# Patient Record
Sex: MALE | Race: BLACK OR AFRICAN AMERICAN | Employment: UNEMPLOYED | URBAN - METROPOLITAN AREA
[De-identification: names, ages, dates, MRNs, and addresses within clinical notes are randomized per-mention and may not be internally consistent; named-entity substitution may affect disease eponyms.]

---

## 2018-12-02 ENCOUNTER — OFFICE VISIT (OUTPATIENT)
Dept: URGENT CARE | Facility: URGENT CARE | Age: 33
End: 2018-12-02
Payer: MEDICAID

## 2018-12-02 VITALS
OXYGEN SATURATION: 100 % | WEIGHT: 160 LBS | SYSTOLIC BLOOD PRESSURE: 128 MMHG | HEART RATE: 86 BPM | TEMPERATURE: 98.2 F | RESPIRATION RATE: 18 BRPM | DIASTOLIC BLOOD PRESSURE: 80 MMHG

## 2018-12-02 DIAGNOSIS — N39.0 URINARY TRACT INFECTION WITHOUT HEMATURIA, SITE UNSPECIFIED: ICD-10-CM

## 2018-12-02 DIAGNOSIS — Z11.3 SCREEN FOR STD (SEXUALLY TRANSMITTED DISEASE): ICD-10-CM

## 2018-12-02 DIAGNOSIS — R82.90 ABNORMAL URINE ODOR: Primary | ICD-10-CM

## 2018-12-02 LAB
ALBUMIN UR-MCNC: NEGATIVE MG/DL
APPEARANCE UR: CLEAR
BACTERIA #/AREA URNS HPF: ABNORMAL /HPF
BILIRUB UR QL STRIP: NEGATIVE
COLOR UR AUTO: YELLOW
GLUCOSE UR STRIP-MCNC: NEGATIVE MG/DL
HGB UR QL STRIP: NEGATIVE
KETONES UR STRIP-MCNC: NEGATIVE MG/DL
LEUKOCYTE ESTERASE UR QL STRIP: ABNORMAL
MUCOUS THREADS #/AREA URNS LPF: PRESENT /LPF
NITRATE UR QL: NEGATIVE
PH UR STRIP: 5.5 PH (ref 5–7)
RBC #/AREA URNS AUTO: ABNORMAL /HPF
SOURCE: ABNORMAL
SP GR UR STRIP: 1.01 (ref 1–1.03)
UROBILINOGEN UR STRIP-ACNC: 0.2 EU/DL (ref 0.2–1)
WBC #/AREA URNS AUTO: ABNORMAL /HPF

## 2018-12-02 PROCEDURE — 87591 N.GONORRHOEAE DNA AMP PROB: CPT | Performed by: PHYSICIAN ASSISTANT

## 2018-12-02 PROCEDURE — 36415 COLL VENOUS BLD VENIPUNCTURE: CPT | Performed by: PHYSICIAN ASSISTANT

## 2018-12-02 PROCEDURE — 87086 URINE CULTURE/COLONY COUNT: CPT | Performed by: PHYSICIAN ASSISTANT

## 2018-12-02 PROCEDURE — 87529 HSV DNA AMP PROBE: CPT | Mod: 59 | Performed by: PHYSICIAN ASSISTANT

## 2018-12-02 PROCEDURE — 86780 TREPONEMA PALLIDUM: CPT | Performed by: PHYSICIAN ASSISTANT

## 2018-12-02 PROCEDURE — 81001 URINALYSIS AUTO W/SCOPE: CPT | Performed by: PHYSICIAN ASSISTANT

## 2018-12-02 PROCEDURE — 87529 HSV DNA AMP PROBE: CPT | Performed by: PHYSICIAN ASSISTANT

## 2018-12-02 PROCEDURE — 86803 HEPATITIS C AB TEST: CPT | Performed by: PHYSICIAN ASSISTANT

## 2018-12-02 PROCEDURE — 86706 HEP B SURFACE ANTIBODY: CPT | Performed by: PHYSICIAN ASSISTANT

## 2018-12-02 PROCEDURE — 87491 CHLMYD TRACH DNA AMP PROBE: CPT | Performed by: PHYSICIAN ASSISTANT

## 2018-12-02 PROCEDURE — 87389 HIV-1 AG W/HIV-1&-2 AB AG IA: CPT | Performed by: PHYSICIAN ASSISTANT

## 2018-12-02 PROCEDURE — 87340 HEPATITIS B SURFACE AG IA: CPT | Performed by: PHYSICIAN ASSISTANT

## 2018-12-02 PROCEDURE — 99203 OFFICE O/P NEW LOW 30 MIN: CPT | Performed by: PHYSICIAN ASSISTANT

## 2018-12-02 RX ORDER — METRONIDAZOLE 500 MG/1
500 TABLET ORAL 2 TIMES DAILY
Qty: 14 TABLET | Refills: 0 | Status: SHIPPED | OUTPATIENT
Start: 2018-12-02 | End: 2018-12-02

## 2018-12-02 RX ORDER — METRONIDAZOLE 500 MG/1
500 TABLET ORAL 2 TIMES DAILY
Qty: 14 TABLET | Refills: 0 | Status: SHIPPED | OUTPATIENT
Start: 2018-12-02 | End: 2019-03-15

## 2018-12-02 RX ORDER — SULFAMETHOXAZOLE/TRIMETHOPRIM 800-160 MG
1 TABLET ORAL 2 TIMES DAILY
Qty: 14 TABLET | Refills: 0 | Status: SHIPPED | OUTPATIENT
Start: 2018-12-02 | End: 2019-03-15

## 2018-12-02 RX ORDER — SULFAMETHOXAZOLE/TRIMETHOPRIM 800-160 MG
1 TABLET ORAL 2 TIMES DAILY
Qty: 14 TABLET | Refills: 0 | Status: SHIPPED | OUTPATIENT
Start: 2018-12-02 | End: 2018-12-02

## 2018-12-02 NOTE — MR AVS SNAPSHOT
"              After Visit Summary   2018    Saroj Castillo    MRN: [id number]           Patient Information     Date Of Birth          1985        Visit Information        Provider Department      2018 3:40 PM Janneth Nesbitt PA-C [facility]        Today's Diagnoses     Abnormal urine odor    -  1    Screen for STD (sexually transmitted disease)        Urinary tract infection without hematuria, site unspecified           Follow-ups after your visit        Who to contact     If you have questions or need follow up information about today's clinic visit or your schedule please contact [facility] directly at [phone].  Normal or non-critical lab and imaging results will be communicated to you by MyChart, letter or phone within 4 business days after the clinic has received the results. If you do not hear from us within 7 days, please contact the clinic through [facility] or phone. If you have a critical or abnormal lab result, we will notify you by phone as soon as possible.  Submit refill requests through [employer] or call your pharmacy and they will forward the refill request to us. Please allow 3 business days for your refill to be completed.          Additional Information About Your Visit        MyChart Information     [employer] lets you send messages to your doctor, view your test results, renew your prescriptions, schedule appointments and more. To sign up, go to [email or web address] . Click on \"Log in\" on the left side of the screen, which will take you to the Welcome page. Then click on \"Sign up Now\" on the right side of the page.     You will be asked to enter the access code listed below, as well as some personal information. Please follow the directions to create your username and password.     Your access code is: [email or web address]  Expires: 3/2/2019  5:02 PM     Your access code will  in 90 days. If you need help or a new code, please call your " [facility] or [phone].        Care EveryWhere ID     This is your Care EveryWhere ID. This could be used by other organizations to access your [facility] medical records  [id number]        Your Vitals Were     Pulse Temperature Respirations Pulse Oximetry          86 98.2  F (36.8  C) (Oral) 18 100%         Blood Pressure from Last 3 Encounters:   12/02/18 128/80    Weight from Last 3 Encounters:   12/02/18 160 lb (72.6 kg)              We Performed the Following     **Hepatitis C Screen Reflex to RNA FUTURE anytime     CHLAMYDIA TRACHOMATIS PCR     Hepatitis B Surface Antibody     Hepatitis B surface antigen     HIV Antigen Antibody Combo     HSV 1 and 2 DNA by PCR     NEISSERIA GONORRHOEA PCR     Treponema Abs w Reflex to RPR and Titer     UA with Microscopic reflex to Culture     Urine Culture Aerobic Bacterial          Today's Medication Changes          These changes are accurate as of 12/2/18  5:02 PM.  If you have any questions, ask your nurse or doctor.               Start taking these medicines.        Dose/Directions    metroNIDAZOLE 500 MG tablet   Commonly known as:  FLAGYL   Used for:  Abnormal urine odor   Started by:  Janneth Nesbitt PA-C        Dose:  500 mg   Take 1 tablet (500 mg) by mouth 2 times daily   Quantity:  14 tablet   Refills:  0       sulfamethoxazole-trimethoprim 800-160 MG tablet   Commonly known as:  BACTRIM DS/SEPTRA DS   Used for:  Abnormal urine odor, Screen for STD (sexually transmitted disease)   Started by:  Janneth Nesbitt PA-C        Dose:  1 tablet   Take 1 tablet by mouth 2 times daily   Quantity:  14 tablet   Refills:  0            Where to get your medicines      These medications were sent to [facility] [place], [place] - [address]  [address], [facility] [address]     Phone:  [phone]     metroNIDAZOLE 500 MG tablet    sulfamethoxazole-trimethoprim 800-160 MG tablet                Primary Care Provider Fax #     Physician No Ref-Primary [phone]       No address on file        Equal Access to Services     GERRYLAMBERT RIGO : Hadii aad ku hadstephenaston Jessietam, wanataliada hayleydarylha, harshata trikamrantalya isbellajaytalya, elsie solano elizabethmiguelina dickeyidaliatemi rodriguez. So [facility] [place] [phone].    ATENCIÓN: Si habla español, tiene a anne disposición servicios gratuitos de asistencia lingüística. Llame al [phone].    We comply with applicable federal civil rights laws and [place] laws. We do not discriminate on the basis of race, color, national origin, age, disability, sex, sexual orientation, or gender identity.            Thank you!     Thank you for choosing [facility]  for your care. Our goal is always to provide you with excellent care. Hearing back from our patients is one way we can continue to improve our services. Please take a few minutes to complete the written survey that you may receive in the mail after your visit with us. Thank you!             Your Updated Medication List - Protect others around you: Learn how to safely use, store and throw away your medicines at www.disposemymeds.org.          This list is accurate as of 12/2/18  5:02 PM.  Always use your most recent med list.                   Brand Name Dispense Instructions for use Diagnosis    metroNIDAZOLE 500 MG tablet    FLAGYL    14 tablet    Take 1 tablet (500 mg) by mouth 2 times daily    Abnormal urine odor       sulfamethoxazole-trimethoprim 800-160 MG tablet    BACTRIM DS/SEPTRA DS    14 tablet    Take 1 tablet by mouth 2 times daily    Abnormal urine odor, Screen for STD (sexually transmitted disease)

## 2018-12-02 NOTE — PROGRESS NOTES
S: 32 yo male here for STD screening.  Wants testing for all STDs.  His girlfriend told him she has trichomonas.  He denies any fever, abdominal pain, back pain or dysuria.  He has noted an odorous urine for a few days. No penile discherge    PMHX-   He had chlamydia several years ago.    FHX- no kidney stones    Allergies not on file    No past medical history on file.     Social - nonsmoker      No current outpatient prescriptions on file prior to visit.  No current facility-administered medications on file prior to visit.     Social History   Substance Use Topics     Smoking status: Not on file     Smokeless tobacco: Not on file     Alcohol use Not on file       ROS:  General: negative for fever  ABD: Denies abd pain  : as above    OBJECTIVE:  /80 (BP Location: Left arm, Patient Position: Chair, Cuff Size: Adult Regular)  Pulse 86  Temp 98.2  F (36.8  C) (Oral)  Resp 18  Wt 160 lb (72.6 kg)  SpO2 100%   General:   awake, alert, and cooperative.  NAD.   Head: Normocephalic, atraumatic.  Eyes: Conjunctiva clear, non icteric.   ABD: soft, no tenderness to palpation , no rigidity, guarding or rebound . No CVAT  Neuro: Alert and oriented - normal speech.     ASSESSMENT:    ICD-10-CM    1. Abnormal urine odor R82.90 NEISSERIA GONORRHOEA PCR     CHLAMYDIA TRACHOMATIS PCR     HIV Antigen Antibody Combo     HSV 1 and 2 DNA by PCR     Hepatitis B Surface Antibody     Hepatitis B surface antigen     Treponema Abs w Reflex to RPR and Titer     **Hepatitis C Screen Reflex to RNA FUTURE anytime     UA with Microscopic reflex to Culture     CANCELED: *UA reflex to Microscopic and Culture ([place] [facility] (except [place] and [place])   2. Screen for STD (sexually transmitted disease) Z11.3 NEISSERIA GONORRHOEA PCR     CHLAMYDIA TRACHOMATIS PCR     HIV Antigen Antibody Combo     HSV 1 and 2 DNA by PCR     Hepatitis B Surface Antibody     Hepatitis B surface antigen     Treponema Abs w Reflex to RPR and  Titer     **Hepatitis C Screen Reflex to RNA FUTURE anytime     UA with Microscopic reflex to Culture     CANCELED: *UA reflex to Microscopic and Culture ([place] [facility] (except [place] and [place])           PLAN: Spoke with . He did not see any trichomonas on the urine slide under the microscope. He saw only a few bacteria. Will treat for trich as he had exposure and also treat for possible UTI.  As per ordered above.  No alcohol with Flagyl. Drink plenty of fluids.  Prevention and treatment of UTI's discussed. Follow up with primary care physician if not improving.  Advised about symptoms which might herald more serious problems.   UC pending.    Janneth Nesbitt PA-C

## 2018-12-03 LAB
BACTERIA SPEC CULT: NO GROWTH
HBV SURFACE AB SERPL IA-ACNC: 1.55 M[IU]/ML
HBV SURFACE AG SERPL QL IA: NONREACTIVE
HCV AB SERPL QL IA: NONREACTIVE
HIV 1+2 AB+HIV1 P24 AG SERPL QL IA: NONREACTIVE
SPECIMEN SOURCE: NORMAL
T PALLIDUM AB SER QL: NONREACTIVE

## 2018-12-04 LAB
HSV1 DNA SPEC QL NAA+PROBE: NEGATIVE
HSV2 DNA SPEC QL NAA+PROBE: NEGATIVE
SPECIMEN SOURCE: NORMAL

## 2018-12-05 LAB
C TRACH DNA SPEC QL NAA+PROBE: NEGATIVE
N GONORRHOEA DNA SPEC QL NAA+PROBE: NEGATIVE
SPECIMEN SOURCE: NORMAL
SPECIMEN SOURCE: NORMAL

## 2018-12-08 ENCOUNTER — TELEPHONE (OUTPATIENT)
Dept: URGENT CARE | Facility: URGENT CARE | Age: 33
End: 2018-12-08

## 2019-02-20 ENCOUNTER — OFFICE VISIT (OUTPATIENT)
Dept: FAMILY MEDICINE | Facility: CLINIC | Age: 34
End: 2019-02-20
Payer: COMMERCIAL

## 2019-02-20 VITALS
OXYGEN SATURATION: 99 % | SYSTOLIC BLOOD PRESSURE: 120 MMHG | TEMPERATURE: 97.3 F | DIASTOLIC BLOOD PRESSURE: 67 MMHG | WEIGHT: 157 LBS | HEART RATE: 98 BPM

## 2019-02-20 DIAGNOSIS — M79.662 PAIN OF LEFT LOWER LEG: Primary | ICD-10-CM

## 2019-02-20 DIAGNOSIS — S82.142D CLOSED FRACTURE OF LEFT TIBIAL PLATEAU WITH ROUTINE HEALING, SUBSEQUENT ENCOUNTER: ICD-10-CM

## 2019-02-20 PROBLEM — S82.143A TIBIAL PLATEAU FRACTURE: Status: ACTIVE | Noted: 2019-02-13

## 2019-02-20 PROCEDURE — 99213 OFFICE O/P EST LOW 20 MIN: CPT | Performed by: FAMILY MEDICINE

## 2019-02-20 RX ORDER — IBUPROFEN 600 MG/1
600 TABLET, FILM COATED ORAL
COMMUNITY
Start: 2019-02-15 | End: 2019-03-15

## 2019-02-20 RX ORDER — OXYCODONE HYDROCHLORIDE 5 MG/1
5-10 TABLET ORAL
COMMUNITY
Start: 2019-02-15

## 2019-02-20 ASSESSMENT — PAIN SCALES - GENERAL: PAINLEVEL: WORST PAIN (10)

## 2019-02-20 NOTE — PROGRESS NOTES
"  SUBJECTIVE:   Saroj Castillo is a 33 year old male who presents to clinic today for the following health issues:      Left Knee Pain    Onset: Pain started last night    Description:   Location: left knee  Character: Sharp, Stabbing and throbbing, \"Horrible\"    Intensity: severe    Progression of Symptoms: worse    Accompanying Signs & Symptoms:  Other symptoms: \"pain goes up and down the body\"    History:   Previous similar pain: no       Precipitating factors:   Trauma or overuse: no     Alleviating factors:  Improved by: nothing    Therapies Tried and outcome: None        Patient states he lost his medication yesterday and is having more pain.  He states he took his blood thinner in the arm yesterday because he was tired of sticking himself in the stomach.  He asked for an evaluation of his heart and lungs and then I asked for more information as to what was going on.  He became angry, stated I was asking him dumb questions and wanted to leave.  He had his daughter help him leave the exam room.    Problem list and histories reviewed & adjusted, as indicated.  Additional history: as documented    There is no problem list on file for this patient.    History reviewed. No pertinent surgical history.    Social History     Tobacco Use     Smoking status: Smoker, Current Status Unknown     Smokeless tobacco: Never Used   Substance Use Topics     Alcohol use: Yes     Comment: once in blue moon     History reviewed. No pertinent family history.        Reviewed and updated as needed this visit by clinical staff  Tobacco  Allergies  Meds  Problems  Med Hx  Surg Hx  Fam Hx  Soc Hx        Reviewed and updated as needed this visit by Provider  Tobacco  Allergies  Meds  Problems  Med Hx  Surg Hx  Fam Hx         ROS:  Constitutional, HEENT, cardiovascular, pulmonary, gi and gu systems are negative, except as otherwise noted.    OBJECTIVE:     /67 (BP Location: Right arm, Patient Position: Chair, Cuff " Size: Adult Regular)   Pulse 98   Temp 97.3  F (36.3  C) (Oral)   Wt 71.2 kg (157 lb)   SpO2 99%   There is no height or weight on file to calculate BMI.  GENERAL: healthy, alert and no distress  MS: left leg in brace and ace wrapped  PSYCH: tangential, anxious and speech pressured    Diagnostic Test Results:  none     ASSESSMENT/PLAN:     1. Pain of left lower leg  Patient told to contact surgeon for more pain medication    2. Closed fracture of left tibial plateau with routine healing, subsequent encounter  As above.    I recommended the patient schedule a physical if he wants a general exam.    FUTURE APPOINTMENTS:       - Follow-up visit in 2 weeks    Enedina Pierre MD  [facility]

## 2019-02-20 NOTE — PATIENT INSTRUCTIONS
At [facility], we strive to deliver an exceptional experience to you, every time we see you.  If you receive a survey in the mail, please send us back your thoughts. We really do value your feedback.    Your care team:                            Family Medicine Internal Medicine   MD Baldomero Cortes MD Shantel Branch-Fleming, MD Katya Georgiev PA-C Megan Hill, APRN JARROD Melvin MD Pediatrics   Johnson Chung, MICHELLE Handley, MD Sallie Herrera APRN CNP   MD Megan Colvin MD Deborah Mielke, MD Lesley Joseph, APRN [facility]      Clinic hours: Monday - Thursday 7 am-7 pm; Fridays 7 am-5 pm.   Urgent care: Monday - Friday 11 am-9 pm; Saturday and Sunday 9 am-5 pm.  Pharmacy : Monday -Thursday 8 am-8 pm; Friday 8 am-6 pm; Saturday and Sunday 9 am-5 pm.     Clinic: [phone]   Pharmacy: [phone]

## 2019-03-15 ENCOUNTER — OFFICE VISIT (OUTPATIENT)
Dept: FAMILY MEDICINE | Facility: CLINIC | Age: 34
End: 2019-03-15
Payer: COMMERCIAL

## 2019-03-15 VITALS
RESPIRATION RATE: 20 BRPM | TEMPERATURE: 98.8 F | HEART RATE: 96 BPM | SYSTOLIC BLOOD PRESSURE: 124 MMHG | OXYGEN SATURATION: 99 % | DIASTOLIC BLOOD PRESSURE: 84 MMHG | WEIGHT: 159.6 LBS

## 2019-03-15 DIAGNOSIS — S82.142D CLOSED FRACTURE OF LEFT TIBIAL PLATEAU WITH ROUTINE HEALING, SUBSEQUENT ENCOUNTER: Primary | ICD-10-CM

## 2019-03-15 DIAGNOSIS — Z59.00 HOMELESS: ICD-10-CM

## 2019-03-15 PROCEDURE — 99214 OFFICE O/P EST MOD 30 MIN: CPT | Performed by: NURSE PRACTITIONER

## 2019-03-15 RX ORDER — ACETAMINOPHEN 500 MG
500-1000 TABLET ORAL EVERY 6 HOURS PRN
Qty: 30 TABLET | Refills: 0 | Status: SHIPPED | OUTPATIENT
Start: 2019-03-15

## 2019-03-15 RX ORDER — ERGOCALCIFEROL 1.25 MG/1
CAPSULE, LIQUID FILLED ORAL
Refills: 0 | COMMUNITY
Start: 2019-02-21

## 2019-03-15 RX ORDER — IBUPROFEN 600 MG/1
600 TABLET, FILM COATED ORAL EVERY 6 HOURS PRN
Qty: 20 TABLET | Refills: 0 | Status: SHIPPED | OUTPATIENT
Start: 2019-03-15 | End: 2019-03-15

## 2019-03-15 RX ORDER — HYDROCODONE BITARTRATE AND ACETAMINOPHEN 5; 325 MG/1; MG/1
TABLET ORAL
Refills: 0 | COMMUNITY
Start: 2019-03-08

## 2019-03-15 RX ORDER — DOCUSATE SODIUM 100 MG/1
100 CAPSULE, LIQUID FILLED ORAL 2 TIMES DAILY
Qty: 30 CAPSULE | Refills: 0 | Status: SHIPPED | OUTPATIENT
Start: 2019-03-15

## 2019-03-15 RX ORDER — ACETAMINOPHEN 500 MG/1
TABLET ORAL
Refills: 0 | COMMUNITY
Start: 2019-03-05

## 2019-03-15 RX ORDER — ACETAMINOPHEN 500 MG
500-1000 TABLET ORAL
COMMUNITY
Start: 2019-03-05

## 2019-03-15 RX ORDER — HYDROCODONE BITARTRATE AND ACETAMINOPHEN 5; 325 MG/1; MG/1
1 TABLET ORAL EVERY 6 HOURS PRN
Qty: 10 TABLET | Refills: 0 | Status: SHIPPED | OUTPATIENT
Start: 2019-03-15 | End: 2019-03-18

## 2019-03-15 SDOH — ECONOMIC STABILITY - HOUSING INSECURITY: HOMELESSNESS UNSPECIFIED: Z59.00
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
## 2019-03-15 NOTE — PATIENT INSTRUCTIONS
Start acetaminophen 500-1000 mg every 6 hours as needed for pain.   Take acetaminophen/hydrocodone (Norco) 1 tab every 6 hours for pain not relieved by acetaminophen (Tylenol) alone. No more than 4000 mg acetaminophen from all sources in 24 hours.  Replacement for lost lovenox sent for the next 4 days - you need to check with your orthopedic surgeon to see how long you should be on it now that you've missed doses  Ice 15 minutes 4-6 times daily  Elevate while resting  Follow up with primary care provider on Monday  Follow up with orthopedic clinic on Monday  I have referred you to our care coordinator - she'll be in touch with you early next week

## 2019-03-18 ENCOUNTER — PATIENT OUTREACH (OUTPATIENT)
Dept: CARE COORDINATION | Facility: CLINIC | Age: 34
End: 2019-03-18

## 2019-03-18 NOTE — PROGRESS NOTES
Clinic Care Coordination Contact    Outreach    Call placed to patient.  He immediately asked for a call back in about an hour and a half.     will call back later today.    SHELL Barkley, Clinic Care Coordinator 3/18/2019   9:44 AM  [phone]

## 2019-03-18 NOTE — PROGRESS NOTES
Clinic Care Coordination Contact    Clinic Care Coordination Contact  OUTREACH    Referral Information:  Referral Source: PCP    Primary Diagnosis: Psychosocial    Chief Complaint   Patient presents with     Clinic Care Coordination - Initial             Universal Utilization: Patient has had one hospital stay and to ED visits in the last 35 days     Utilization    Last refreshed: 3/18/2019 11:41 AM:  Hospital Admissions 0           Last refreshed: 3/18/2019 11:41 AM:  ED Visits 0           Last refreshed: 3/18/2019 11:41 AM:  No Show Count (past year) 1              Current as of: 3/18/2019 11:41 AM              Clinical Concerns:  Current Medical Concerns: Patient said he is using a walker and a wheelchair and needs to go to rehab.  Patient was at an appointment yesterday and was given a small refill of his pain meds.    Per social work discharge planning assessment, patient was discharged with crutches, had passed physical therapy, and significant other learned the Lovenox shots and was willing to administer to patient.  At some point this changed as patient noted in office visit that there was a no contact order and he could go back home.    Current Behavioral Concerns: Not discussed.  Education Provided to patient: Discussed that patient most likely will not have coverage for TCU and to call his insurance to verify.  Took a few tries to get patient to write down the phone number as he continued to interrupt or talk with another person in the middle of giving him the number.    Patient said he was using a walker and a wheelchair and needed to go to rehab so he can have a place to rest and recover. Was unable to fully discuss the activities that would occur at rehab as patient ended the phone call. Per hospital discharge report pt did Pass PT and was discharged with crutches.      Health Maintenance Reviewed: Due/Overdue   Health Maintenance Due   Topic Date Due     PREVENTIVE CARE VISIT  1985      DTAP/TDAP/TD IMMUNIZATION (1 - Tdap) 02/26/2012     INFLUENZA VACCINE (1) 09/01/2018       Clinical Pathway: None    Medication Management:  Not able to fully discuss.  Patient's focus was trying to get into a rehab facility for his knee.    Functional Status:  Dependent ADLs:: Ambulation-walker  Bed or wheelchair confined:: No  Mobility Status: Independent w/Device  Any fall with injury in the past year?: Yes    Living Situation:  Current living arrangement:: Other  Type of residence:: Homeless    Diet/Exercise/Sleep:       Transportation:  Transportation concerns (GOAL):: Yes        Psychosocial:  Informal Support system:: Friends     Financial/Insurance:   Financial/Insurance concerns (GOAL):: Yes  Per chart and referral patient is living in a motel and homeless.  He noted that there was an order of protection against him and he was unable to go to his former residence.  It is noted in the chart that he was in intermediate at some time although it was unclear as to when and that he had court on 3/14/2019.  Per 3/15/2019 office visit at [facility] he does note that he has an appointment with a new primary care provider in the next week and or so appointment in 2-3 weeks.  Unable to see these appointments in [facility] or care everywhere.    Discussed options of him checking with his insurance for coverage as well as calling [facility] front door for supports.  Number was given for both resources. Discharge planning from the hospital noted that he was going to be discharging with crutches and that this CP referral for follow-up in the community was placed.     Resources and Interventions:  Current Resources:    ;   Community Resources: None     Equipment Currently Used at Home: walker, standard, wheelchair, manual    Advance Care Plan/Directive  Advanced Care Plans/Directives on file:: No    Referrals Placed: [facility]     Goals:         Patient/Caregiver understanding: n/a           Plan: Patient to call  resources given and attend appointments as scheduled.  At this time care coordination will not call out as patient said he would call if he has any further questions.   will be available for further questions and at this time we will not send introduction letter or access plan is was unable to verify address.  Address in the system appears to still be his previous housing.    SHELL Barkley, Clinic Care Coordinator 3/18/2019   2:03 PM  [phone]

## 2020-10-26 ENCOUNTER — OFFICE VISIT (OUTPATIENT)
Dept: URGENT CARE | Facility: URGENT CARE | Age: 35
End: 2020-10-26

## 2020-10-26 VITALS
WEIGHT: 164.2 LBS | RESPIRATION RATE: 16 BRPM | TEMPERATURE: 97.3 F | OXYGEN SATURATION: 97 % | DIASTOLIC BLOOD PRESSURE: 89 MMHG | SYSTOLIC BLOOD PRESSURE: 134 MMHG | HEART RATE: 87 BPM

## 2020-10-26 DIAGNOSIS — J02.0 STREP THROAT: Primary | ICD-10-CM

## 2020-10-26 DIAGNOSIS — J02.9 SORE THROAT: ICD-10-CM

## 2020-10-26 DIAGNOSIS — Z72.51 UNPROTECTED SEX: ICD-10-CM

## 2020-10-26 LAB
ALBUMIN UR-MCNC: NEGATIVE MG/DL
APPEARANCE UR: CLEAR
BILIRUB UR QL STRIP: NEGATIVE
COLOR UR AUTO: YELLOW
DEPRECATED S PYO AG THROAT QL EIA: POSITIVE
GLUCOSE UR STRIP-MCNC: NEGATIVE MG/DL
HGB UR QL STRIP: ABNORMAL
KETONES UR STRIP-MCNC: NEGATIVE MG/DL
LEUKOCYTE ESTERASE UR QL STRIP: NEGATIVE
NITRATE UR QL: NEGATIVE
PH UR STRIP: 6.5 PH (ref 5–7)
RBC #/AREA URNS AUTO: NORMAL /HPF
SOURCE: ABNORMAL
SP GR UR STRIP: 1.01 (ref 1–1.03)
SPECIMEN SOURCE: ABNORMAL
UROBILINOGEN UR STRIP-ACNC: 0.2 EU/DL (ref 0.2–1)
WBC #/AREA URNS AUTO: NORMAL /HPF

## 2020-10-26 PROCEDURE — 87591 N.GONORRHOEAE DNA AMP PROB: CPT | Performed by: PHYSICIAN ASSISTANT

## 2020-10-26 PROCEDURE — 99000 SPECIMEN HANDLING OFFICE-LAB: CPT | Performed by: PHYSICIAN ASSISTANT

## 2020-10-26 PROCEDURE — 86695 HERPES SIMPLEX TYPE 1 TEST: CPT | Performed by: PHYSICIAN ASSISTANT

## 2020-10-26 PROCEDURE — 87389 HIV-1 AG W/HIV-1&-2 AB AG IA: CPT | Performed by: PHYSICIAN ASSISTANT

## 2020-10-26 PROCEDURE — 86696 HERPES SIMPLEX TYPE 2 TEST: CPT | Performed by: PHYSICIAN ASSISTANT

## 2020-10-26 PROCEDURE — U0003 INFECTIOUS AGENT DETECTION BY NUCLEIC ACID (DNA OR RNA); SEVERE ACUTE RESPIRATORY SYNDROME CORONAVIRUS 2 (SARS-COV-2) (CORONAVIRUS DISEASE [COVID-19]), AMPLIFIED PROBE TECHNIQUE, MAKING USE OF HIGH THROUGHPUT TECHNOLOGIES AS DESCRIBED BY CMS-2020-01-R: HCPCS | Performed by: PHYSICIAN ASSISTANT

## 2020-10-26 PROCEDURE — 81001 URINALYSIS AUTO W/SCOPE: CPT | Performed by: PHYSICIAN ASSISTANT

## 2020-10-26 PROCEDURE — 86780 TREPONEMA PALLIDUM: CPT | Mod: 90 | Performed by: PHYSICIAN ASSISTANT

## 2020-10-26 PROCEDURE — 99213 OFFICE O/P EST LOW 20 MIN: CPT | Performed by: PHYSICIAN ASSISTANT

## 2020-10-26 PROCEDURE — 36415 COLL VENOUS BLD VENIPUNCTURE: CPT | Performed by: PHYSICIAN ASSISTANT

## 2020-10-26 PROCEDURE — 87880 STREP A ASSAY W/OPTIC: CPT | Performed by: PHYSICIAN ASSISTANT

## 2020-10-26 PROCEDURE — 87491 CHLMYD TRACH DNA AMP PROBE: CPT | Performed by: PHYSICIAN ASSISTANT

## 2020-10-26 RX ORDER — AZITHROMYCIN 250 MG/1
TABLET, FILM COATED ORAL
Qty: 6 TABLET | Refills: 0 | Status: SHIPPED | OUTPATIENT
Start: 2020-10-26

## 2020-10-26 ASSESSMENT — ENCOUNTER SYMPTOMS
WHEEZING: 0
EYE ITCHING: 0
CONSTITUTIONAL NEGATIVE: 1
COUGH: 0
HEMATURIA: 0
FREQUENCY: 0
NEUROLOGICAL NEGATIVE: 1
HEADACHES: 0
MYALGIAS: 0
ABDOMINAL PAIN: 0
POLYDIPSIA: 0
GASTROINTESTINAL NEGATIVE: 1
LIGHT-HEADEDNESS: 0
RHINORRHEA: 0
DIAPHORESIS: 0
DIZZINESS: 0
SORE THROAT: 1
RESPIRATORY NEGATIVE: 1
DIARRHEA: 0
CHEST TIGHTNESS: 0
PALPITATIONS: 0
MUSCULOSKELETAL NEGATIVE: 1
NAUSEA: 0
DYSURIA: 0
EYE DISCHARGE: 0
VOMITING: 0
CHILLS: 0
EYE REDNESS: 0
EYES NEGATIVE: 1
WEAKNESS: 0
ADENOPATHY: 0
CARDIOVASCULAR NEGATIVE: 1
ENDOCRINE NEGATIVE: 1
SHORTNESS OF BREATH: 0
FEVER: 0

## 2020-10-26 NOTE — LETTER
[facility] [address] [address]          October 26, 2020    RE:  Saroj Castillo                                                                                                                                                       [id number] [facility] [id number]            To whom it may concern:    Saroj Castillo is under my professional care for illness.  He  should not return to work until COVID results are known.    Sincerely,        Sebastien Willoughby PA-C

## 2020-10-26 NOTE — PROGRESS NOTES
"Chief Complaint:    Chief Complaint   Patient presents with     STD     no sx-was told by his girl to get check up, she said \"he throw her pH balance off\"     Throat Problem     throat been swelling over the weekend-could be from throwing up from drinking         HPI:  Saroj Castillo is a 35 year old male who presents for STD testing.  He was told by his girlfriend to get checked.  His girlfriend said that he \"threw her PH off\".  He denies any discharge from the penis.  No testicular pain.  No lesions on the penis.      Patient also mentions that he has had a sore throat for the past 3 days.  He did vomit this weekend due to drinking too much.    ROS:      Review of Systems   Constitutional: Negative.  Negative for chills, diaphoresis and fever.   HENT: Positive for sore throat. Negative for congestion, ear pain and rhinorrhea.    Eyes: Negative.  Negative for discharge, redness and itching.   Respiratory: Negative.  Negative for cough, chest tightness, shortness of breath and wheezing.    Cardiovascular: Negative.  Negative for chest pain and palpitations.   Gastrointestinal: Negative.  Negative for abdominal pain, diarrhea, nausea and vomiting.   Endocrine: Negative.  Negative for polydipsia and polyuria.   Genitourinary: Negative for discharge, dysuria, frequency, hematuria, penile pain, penile swelling, scrotal swelling, testicular pain and urgency.   Musculoskeletal: Negative.  Negative for myalgias.   Skin: Negative for rash.   Allergic/Immunologic: Negative for immunocompromised state.   Neurological: Negative.  Negative for dizziness, weakness, light-headedness and headaches.   Hematological: Negative for adenopathy.       Family History   History reviewed. No pertinent family history.     Problem history  Patient Active Problem List   Diagnosis     Tibial plateau fracture        Allergies  No Known Allergies     Social History  Social History     Socioeconomic History     Marital status: Single     Spouse " name: Not on file     Number of children: Not on file     Years of education: Not on file     Highest education level: Not on file   Occupational History     Not on file   Social Needs     Financial resource strain: Not on file     Food insecurity     Worry: Not on file     Inability: Not on file     Transportation needs     Medical: Not on file     Non-medical: Not on file   Tobacco Use     Smoking status: Smoker, Current Status Unknown     Smokeless tobacco: Never Used   Substance and Sexual Activity     Alcohol use: Yes     Comment: once in blue moon     Drug use: No     Sexual activity: Not on file   Lifestyle     Physical activity     Days per week: Not on file     Minutes per session: Not on file     Stress: Not on file   Relationships     Social connections     Talks on phone: Not on file     Gets together: Not on file     Attends Confucianism service: Not on file     Active member of club or organization: Not on file     Attends meetings of clubs or organizations: Not on file     Relationship status: Not on file     Intimate partner violence     Fear of current or ex partner: Not on file     Emotionally abused: Not on file     Physically abused: Not on file     Forced sexual activity: Not on file   Other Topics Concern     Not on file   Social History Narrative     Not on file        Current Meds    Current Outpatient Medications:      azithromycin (ZITHROMAX) 250 MG tablet, Two tablets first day, then one tablet daily for four days., Disp: 6 tablet, Rfl: 0     acetaminophen (TYLENOL) 500 MG tablet, Take 500-1,000 mg by mouth, Disp: , Rfl:      acetaminophen (TYLENOL) 500 MG tablet, Take 1-2 tablets (500-1,000 mg) by mouth every 6 hours as needed for mild pain (Patient not taking: Reported on 10/26/2020), Disp: 30 tablet, Rfl: 0     calcium carbonate-vitamin D (OYSTER SHELL CALCIUM/D) 500-200 MG-UNIT tablet, Take 1 tablet by mouth, Disp: , Rfl:      calcium carbonate-vitamin D (OYSTER SHELL CALCIUM/D) 500-200  MG-UNIT tablet, Take 1 tablet by mouth, Disp: , Rfl:      docusate sodium (COLACE) 100 MG capsule, Take 1 capsule (100 mg) by mouth 2 times daily (Patient not taking: Reported on 10/26/2020), Disp: 30 capsule, Rfl: 0     enoxaparin (LOVENOX) 40 MG/0.4ML syringe, Inject 0.4 mLs (40 mg) Subcutaneous every 24 hours (Patient not taking: Reported on 10/26/2020), Disp: 4 Syringe, Rfl: 0     GABAPENTIN PO, , Disp: , Rfl:      HYDROcodone-acetaminophen (NORCO) 5-325 MG tablet, , Disp: , Rfl: 0     oxyCODONE (ROXICODONE) 5 MG tablet, Take 5-10 mg by mouth, Disp: , Rfl:      SM PAIN RELIEVER EX  MG tablet, , Disp: , Rfl: 0     vitamin D2 (ERGOCALCIFEROL) 12164 units (1250 mcg) capsule, TK ONE C PO  WEEKLY, Disp: , Rfl: 0     OBJECTIVE     Vital signs noted and reviewed by Sebastien Willoughby PA-C  /89 (BP Location: Left arm, Patient Position: Sitting, Cuff Size: Adult Regular)   Pulse 87   Temp 97.3  F (36.3  C) (Tympanic)   Resp 16   Wt 74.5 kg (164 lb 3.2 oz)   SpO2 97%      Physical Exam  Vitals signs and nursing note reviewed.   Constitutional:       General: He is not in acute distress.     Appearance: He is well-developed. He is not ill-appearing, toxic-appearing or diaphoretic.   HENT:      Head: Normocephalic and atraumatic.      Right Ear: Hearing, tympanic membrane, ear canal and external ear normal. Tympanic membrane is not perforated, erythematous, retracted or bulging.      Left Ear: Hearing, tympanic membrane, ear canal and external ear normal. Tympanic membrane is not perforated, erythematous, retracted or bulging.      Nose: Nose normal. No mucosal edema or rhinorrhea.      Mouth/Throat:      Pharynx: Posterior oropharyngeal erythema and uvula swelling present. No pharyngeal swelling or oropharyngeal exudate.      Tonsils: No tonsillar exudate or tonsillar abscesses. 0 on the right. 0 on the left.   Eyes:      Pupils: Pupils are equal, round, and reactive to light.   Neck:      Musculoskeletal:  Normal range of motion and neck supple.   Cardiovascular:      Rate and Rhythm: Normal rate and regular rhythm.      Heart sounds: Normal heart sounds, S1 normal and S2 normal. Heart sounds not distant. No murmur. No friction rub. No gallop.    Pulmonary:      Effort: Pulmonary effort is normal. No respiratory distress.      Breath sounds: Normal breath sounds. No decreased breath sounds, wheezing, rhonchi or rales.   Abdominal:      General: Bowel sounds are normal. There is no distension.      Palpations: Abdomen is soft.      Tenderness: There is no abdominal tenderness.   Genitourinary:     Penis: Normal and uncircumcised. No erythema, tenderness, discharge, swelling or lesions.       Testes:         Right: Mass, tenderness or swelling not present.         Left: Mass, tenderness or swelling not present.      Epididymis:      Right: Normal. Not inflamed.      Left: Normal. Not inflamed.   Lymphadenopathy:      Cervical: No cervical adenopathy.   Skin:     General: Skin is warm and dry.      Findings: No rash.   Neurological:      Mental Status: He is alert.      Cranial Nerves: No cranial nerve deficit.   Psychiatric:         Attention and Perception: He is attentive.         Speech: Speech normal.         Behavior: Behavior normal. Behavior is cooperative.         Thought Content: Thought content normal.         Judgment: Judgment normal.               Labs:     Results for orders placed or performed in visit on 10/26/20   Streptococcus A Rapid Scr w Reflx to PCR     Status: Abnormal    Specimen: Throat   Result Value Ref Range    Strep Specimen Description Throat     Streptococcus Group A Rapid Screen Positive (A) NEG^Negative           ASSESSMENT     1. Strep throat    2. Unprotected sex    3. Sore throat           PLAN    Urinalysis discussed with patient.  This was unremarkable for UTI.  RST was positive.  Rx for Zithromax today.  COVID test ordered and swab collected.    We will call with STD testing when  available.  Follow up with PCP in 1 week if symptoms are not improving.  Worrisome symptoms discussed with instructions to go to the ED.  Patient verbalized understanding and agreed with this plan.          Sebastien Willoughby PA-C  10/26/2020, 3:16 PM

## 2020-10-26 NOTE — PATIENT INSTRUCTIONS
Patient Education     Pharyngitis: Strep (Confirmed)    You have had a positive test for strep throat. Strep throat is a contagious illness. It is spread by coughing, kissing or by touching others after touching your mouth or nose. Symptoms include throat pain that is worse with swallowing, aching all over, headache, and fever. It is treated with antibiotic medicine. This should help you start to feel better in 1 to 2 days.  Home care    Rest at home. Drink plenty of fluids to you won't get dehydrated.    No work or school for the first 2 days of taking the antibiotics. After this time, you will not be contagious. You can then return to school or work if you are feeling better.     Take antibiotic medicine for the full 10 days, even if you feel better. This is very important to ensure the infection is treated. It is also important to prevent medicine-resistant germs from developing. If you were given an antibiotic shot, you don't need any more antibiotics.    You may use acetaminophen or ibuprofen to control pain or fever, unless another medicine was prescribed for this. Talk with your healthcare provider before taking these medicines if you have chronic liver or kidney disease. Also talk with your healthcare provider if you have had a stomach ulcer or GI bleeding.    Throat lozenges or sprays help reduce pain. Gargling with warm saltwater will also reduce throat pain. Dissolve 1/2 teaspoon of salt in 1 glass of warm water. This may be useful just before meals.     Soft foods are OK. Don't eat salty or spicy foods.  Follow-up care  Follow up with your healthcare provider or our staff if you don't get better over the next week.  When to seek medical advice  Call your healthcare provider right away if any of these occur:    Fever of 100.4 F (38 C) or higher, or as directed by your healthcare provider    New or worsening ear pain, sinus pain, or headache    Painful lumps in the back of neck    Stiff neck    Lymph  nodes getting larger or becoming soft in the middle    You can't swallow liquids or you can't open your mouth wide because of throat pain    Signs of dehydration. These include very dark urine or no urine, sunken eyes, and dizziness.    Trouble breathing or noisy breathing    Muffled voice    Rash  Prevention  Here are steps you can take to help prevent an infection:    Keep good hand washing habits.    Don t have close contact with people who have sore throats, colds, or other upper respiratory infections.    Don t smoke, and stay away from secondhand smoke.  Date Last Reviewed: 11/1/2017 2000-2019 [employer] [address] [place] [address], [place] [address]. All rights reserved. This information is not intended as a substitute for professional medical care. Always follow your healthcare professional's instructions.

## 2020-10-27 ENCOUNTER — TELEPHONE (OUTPATIENT)
Dept: URGENT CARE | Facility: URGENT CARE | Age: 35
End: 2020-10-27

## 2020-10-27 LAB
C TRACH DNA SPEC QL NAA+PROBE: NEGATIVE
HIV 1+2 AB+HIV1 P24 AG SERPL QL IA: NONREACTIVE
HSV1 IGG SERPL QL IA: >8 AI (ref 0–0.8)
HSV2 IGG SERPL QL IA: <0.2 AI (ref 0–0.8)
N GONORRHOEA DNA SPEC QL NAA+PROBE: NEGATIVE
SARS-COV-2 RNA SPEC QL NAA+PROBE: NOT DETECTED
SPECIMEN SOURCE: NORMAL
T PALLIDUM AB SER QL: NONREACTIVE

## 2020-10-27 NOTE — TELEPHONE ENCOUNTER
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  Remaining STD testing was negative.     Sebastien Willoughby PA-C     This writer attempted to contact Saroj on 10/27/20      Reason for call results and left message.      If patient calls back:   Registered Nurse called. Follow Triage Call workflow        Lindsey Cao RN

## 2020-10-27 NOTE — TELEPHONE ENCOUNTER
Patient called back and was informed of the below per provider documentation. Patient verbalizes understanding. He is asking for a copy of his results to be mailed to his address.     Team please mail. Patient declined MyChart.     Lindsey Cao RN  [facility]